# Patient Record
Sex: MALE | Race: OTHER | Employment: FULL TIME | URBAN - METROPOLITAN AREA
[De-identification: names, ages, dates, MRNs, and addresses within clinical notes are randomized per-mention and may not be internally consistent; named-entity substitution may affect disease eponyms.]

---

## 2017-09-04 ENCOUNTER — HOSPITAL ENCOUNTER (EMERGENCY)
Age: 37
Discharge: HOME | End: 2017-09-04
Attending: EMERGENCY MEDICINE | Admitting: EMERGENCY MEDICINE
Payer: SELF-PAY

## 2017-09-04 VITALS
OXYGEN SATURATION: 100 % | SYSTOLIC BLOOD PRESSURE: 151 MMHG | HEIGHT: 66 IN | WEIGHT: 205 LBS | DIASTOLIC BLOOD PRESSURE: 123 MMHG | BODY MASS INDEX: 32.95 KG/M2 | HEART RATE: 73 BPM | RESPIRATION RATE: 16 BRPM | TEMPERATURE: 97.7 F

## 2017-09-04 DIAGNOSIS — S39.012A LUMBAR STRAIN, INITIAL ENCOUNTER: Primary | ICD-10-CM

## 2017-09-04 DIAGNOSIS — R03.0 ELEVATED BLOOD PRESSURE READING: ICD-10-CM

## 2017-09-04 PROCEDURE — 99282 EMERGENCY DEPT VISIT SF MDM: CPT

## 2017-09-04 RX ORDER — CYCLOBENZAPRINE HCL 10 MG
10 TABLET ORAL
Qty: 10 TAB | Refills: 0 | Status: SHIPPED | OUTPATIENT
Start: 2017-09-04

## 2017-09-04 RX ORDER — IBUPROFEN 600 MG/1
600 TABLET ORAL
Status: DISCONTINUED | OUTPATIENT
Start: 2017-09-04 | End: 2017-09-04

## 2017-09-04 RX ORDER — CYCLOBENZAPRINE HCL 10 MG
10 TABLET ORAL
Status: DISCONTINUED | OUTPATIENT
Start: 2017-09-04 | End: 2017-09-04

## 2017-09-04 RX ORDER — IBUPROFEN 600 MG/1
600 TABLET ORAL
Qty: 20 TAB | Refills: 0 | Status: SHIPPED | OUTPATIENT
Start: 2017-09-04

## 2017-09-04 NOTE — DISCHARGE INSTRUCTIONS
Distensión de la espalda: Instrucciones de cuidado - [ Back Strain: Care Instructions ]  Instrucciones de cuidado    La distensión de la espalda ocurre cuando usted estira demasiado, o fuerza, un músculo de la espalda. Usted puede lesionarse la espalda en un accidente o cuando hace ejercicio o levanta algo. La mayoría de los andra de espalda mejoran con reposo y Gabriel. Usted puede cuidarse en el hogar para ayudar a que house espalda sane. La atención de seguimiento es vesna parte clave de house tratamiento y seguridad. Asegúrese de hacer y acudir a todas las citas, y llame a house médico si está teniendo problemas. También es vesna buena idea saber los resultados de los exámenes y mantener vesna lista de los medicamentos que main. ¿Cómo puede cuidarse en el hogar? · Trate de permanecer tan activo dionne pueda, denise deje de hacer o reduzca cualquier actividad que le produzca dolor. · Colóquese hielo o vesna compresa fría en el músculo adolorido de 10 a 20 minutos cada vez para detener la hinchazón. Trate de hacerlo cada 1 o 2 horas javier 3 días (cuando esté despierto) o hasta que la hinchazón baje. Póngase un paño sahu entre el hielo y la piel. · Después de 2 o 3 días, coloque vesna almohadilla térmica ajustada a baja temperatura o un paño tibio sobre la espalda. Algunos médicos sugieren que se alterne entre tratamientos calientes y fríos. · [employer] analgésicos (medicamentos para el dolor) exactamente según las indicaciones. ¨ Si el médico le recetó un analgésico, tómelo según las indicaciones. ¨ Si no está tomando un analgésico recetado, pregúntele a house médico si puede larry lisa de [employer]. · Trate de dormir de lado con vesna almohada entre las piernas. O, colóquese vesna almohada debajo de las rodillas cuando se acueste [place] Estas medidas pueden aliviar el dolor en la parte baja de la espalda. · Clemetine Fawn a poco a house nivel habitual de actividades. ¿Cuándo debe pedir ayuda?   Llame al 911 en cualquier momento que considere que necesita atención de [place]. Por ejemplo, llame si:  · Es completamente incapaz de  vesna pierna. Llame a house médico ahora mismo o busque atención médica inmediata si:  · Tiene síntomas nuevos o peores en las piernas, el abdomen o las nalgas (glúteos). Los síntomas pueden incluir:  ¨ Entumecimiento u hormigueo. ¨ Debilidad. ¨ Dolor. · Pierde el control de la vejiga o del intestino. Preste especial atención a los cambios en house polly y asegúrese de comunicarse con house médico si no mejora dionne se esperaba. ¿Dónde puede encontrar más información en inglés? Dudley Owego a [email or web address].[email or web address] Deborah Main [id number] en la búsqueda para aprender más acerca de \"Distensión de la espalda: Instrucciones de cuidado - [ Back Strain: Care Instructions ]. \"  Revisado: 21 marzo, 2017  Versión del contenido: 11.3  © 4717-8696 Healthwise, Incorporated. Las instrucciones de cuidado fueron adaptadas bajo licencia por [employer] (which disclaims liability or warranty for this information). Si usted tiene [place] afección médica o sobre estas instrucciones, siempre pregunte a house profesional de polly. Healthwise, Incorporated niega toda garantía o responsabilidad por house uso de esta información. Presión arterial gerald: Instrucciones de cuidado - [ High Blood Pressure: Care Instructions ]  Instrucciones de cuidado  Si house presión arterial suele ser superior a 140/90, usted tiene presión arterial gerald o hipertensión. [place] significa que el número de [place] es 140 o superior o que el número de abajo es 90 o superior, o ambas cosas. A pesar de lo que mucha gente carlos, la hipertensión no suele causar dolor de lupe ni provocar mareos o aturdimiento. Generalmente, no presenta síntomas. Sin embargo, aumenta el riesgo de ataque al corazón, ataque cerebral, y daños en los riñones o en los ojos. A mayor presión arterial, mayor riesgo.   House médico le dará vesna meta para house presión arterial. House meta se basará en house polly y house edad. Un ejemplo de meta es mantener house presión arterial por debajo de 140/90. Los cambios de estilo de sharron, dionne alimentarse en forma saludable y [place], siempre son importantes para ayudarle a bajar la presión arterial. También podría larry medicamentos para lograr house meta para la presión arterial.  La atención de seguimiento es vesna parte clave de house tratamiento y seguridad. Asegúrese de hacer y acudir a todas las citas, y llame a house médico si está teniendo problemas. También es vesna buena idea saber los resultados de liang exámenes y mantener vesna lista de los medicamentos que main. ¿Cómo puede cuidarse en el hogar? Tratamiento médico  Si juliette de larry liang medicamentos, house presión arterial volverá a subir. Es posible que deba larry un tipo de [place], o más de [place], para reducir la presión arterial. Sea micaela con los medicamentos. [place] house medicamento exactamente dionne se lo hayan indicado. Llame a house médico si carlos estar teniendo problemas con house medicamento. Hable con house médico antes de empezar a larry Jorge Carty. La aspirina puede ayudar a determinadas personas a reducir house riesgo de tener un ataque cardíaco o un ataque cerebral. Buffy larry aspirina no es adecuado para todo el TRENGEREID, debido a que puede causar sangrado grave. Visite a house médico periódicamente. Usted podría necesitar consultar a house médico con más frecuencia al principio o hasta que se reduzca house presión arterial.  Si usted está tomando medicamentos para la presión arterial, hable con house médico antes de larry medicamentos descongestionantes o antiinflamatorios, dionne ibuprofeno. Algunos de estos medicamentos pueden elevar la presión arterial.  Aprenda a revisarse la presión arterial en house hogar. Cambios en el estilo de sharron  Mantenga un peso saludable. [place] es particularmente importante si aumenta de peso en la guille de la cintura.  Bajar solo 10 libras (4.5 kg) puede ayudarle a reducir house presión arterial.  Ericka más ejercicios si house médico se lo recomienda. Caminar es vesna buena opción. Poco a poco, aumente la distancia que [employer]. Intente hacer por lo menos 30 minutos de ejercicio la mayoría de los días de la [place]. También podría nadar, montar en bicicleta o hacer otras actividades. No tome alcohol o limite la cantidad que [place]. Hable con house médico acerca de si puede larry alcohol. Trate de limitar la cantidad de sodio que consume a menos de 2,300 miligramos (mg) al día. House médico podría pedirle que trate de consumir menos de 1,500 mg al día. Coma abundantes frutas (dionne bananas [plátanos] y naranjas), verduras, legumbres, granos integrales y productos lácteos de bajo contenido de [place]. Reduzca la cantidad de grasas saturadas en house Anatoliy Holiday. Los productos derivados de los [place], dionne la Noni [place], el queso y la carne, contienen grasas saturadas. El limitar estos alimentos podría ayudarle a bajar de peso y [place] reducir house riesgo de vesna enfermedad cardíaca. No fume. El hábito de fumar aumenta house riesgo de ataque cerebral y ataque al corazón. Si necesita ayuda para dejar de fumar, hable con house médico sobre programas y medicamentos para dejar de fumar. Estos pueden aumentar liang probabilidades de dejar el hábito para siempre. ¿Cuándo debe pedir ayuda? Llame al 911 en cualquier momento que considere que necesita atención de [place]. [place] puede significar que tiene síntomas que sugieren que house presión arterial le está causando un problema cardíaco o vascular grave. House presión arterial podría ser superior a 180/110. Por ejemplo, llame al 911 si:  Tiene síntomas de un ataque al corazón. Estos pueden incluir:  Dolor o presión en el pecho, o vesna sensación extraña en el pecho. Sudoración. Falta de aire. Náuseas o vómito.   Dolor, presión o vesna sensación extraña en la espalda, el mago, la mandíbula, la parte superior del abdomen o en lisa o ambos hombros o brazos. Aturdimiento o debilidad repentina. Latidos del corazón rápidos o irregulares. Tiene síntomas de un ataque cerebral. Estos pueden incluir:  Entumecimiento, hormigueo, debilidad o parálisis repentinos en la shailesh, el brazo o la pierna, sobre todo en un solo lado del cuerpo. Cambios repentinos en la visión. Dificultades repentinas para hablar. Confusión repentina o dificultad súbita para comprender frases sencillas. Problemas repentinos para caminar o mantener el equilibrio. Dolor de [place] intenso y repentino, distinto de los andra de [place]. Tiene un dolor intenso en la espalda o el abdomen. No espere a que la presión arterial baje por sí alex. Obtenga ayuda de inmediato. Llame a house médico ahora mismo o busque atención de inmediato si:  Tiene la presión arterial mucho más gerald de lo normal (dionne 180/110 o más gerald), denise no tiene síntomas. Piensa que la presión arterial gerald le está provocando síntomas, dionne:  Dolor de lupe intenso. Chaka Neal. Vigile de cerca los [facility], y asegúrese de comunicarse con house médico si:  House presión arterial mide 140/90 o más en al menos 2 ocasiones. [place] significa que el número de [place] es 140 o superior o el número de abajo es 90 o superior, o ambas cosas. Ruthie que podría estar teniendo efectos secundarios de los medicamentos para la presión arterial.  House presión arterial suele ser normal, denise se eleva por encima de lo normal en al menos 2 ocasiones. ¿Dónde puede encontrar más información en inglés? Gladise Kiloa a [email or web address].[email or web address] Noelle Mora [id number] en la búsqueda para aprender más acerca de \"Presión arterial gerald: Instrucciones de cuidado - [ High Blood Pressure: Care Instructions ]. \"  Revisado: 8 agosto, 2016  Versión del contenido: 11.3  © 4614-2108 [employer].  Las instrucciones de cuidado fueron adaptadas bajo licencia por [employer] (which disclaims liability or warranty for this information). Si usted tiene [place] afección médica o sobre estas instrucciones, siempre pregunte a house profesional de polly. [employer] niega toda garantía o responsabilidad por house uso de esta información.

## 2017-09-04 NOTE — ED PROVIDER NOTES
HPI Comments: Seen at: 9/4/2017 1:36 PM    Alexandre Silva is a 39 y.o. male with no pertinent PMHx, presenting to the ED c/o back pain starting 3 days ago. Pain radiates down bilateral legs, and is exacerbated by straightening legs and walking. He tries tylenol without improvements. He denies numbness and tingling. Pt denies any urinary problems. denies any injury or trauma. No other acute symptoms or complaints were noted. PCP: None    The history is provided by the patient. The history is limited by a language barrier. No  was used. No past medical history on file. No past surgical history on file. No family history on file. Social History     Social History    Marital status:      Spouse name: N/A    Number of children: N/A    Years of education: N/A     Occupational History    Not on file. Social History Main Topics    Smoking status: Not on file    Smokeless tobacco: Not on file    Alcohol use Not on file    Drug use: Not on file    Sexual activity: Not on file     Other Topics Concern    Not on file     Social History Narrative         ALLERGIES: Review of patient's allergies indicates no known allergies. Review of Systems   Constitutional: Negative for fever. Musculoskeletal: Positive for back pain and myalgias. All other systems reviewed and are negative. Vitals:    09/04/17 1333   BP: (!) 151/123   Pulse: 73   Resp: 16   Temp: 97.7 °F (36.5 °C)   SpO2: 100%   Weight: 93 kg (205 lb)   Height: 5' 6\" (1.676 m)            Physical Exam   Constitutional: He is oriented to person, place, and time. He appears well-developed and well-nourished. HENT:   Head: Normocephalic and atraumatic. Neck: Neck supple. No JVD present. Musculoskeletal: He exhibits no edema. No midline spinal body tenderness. Spasm and tenderness on left perispinal region. Negative straight leg raise. Full range of motion in bilateral hip flexor.  Pain with full extension bilaterally. Gross sensation intact. Normal gait. Neurological: He is alert and oriented to person, place, and time. Skin: Skin is warm and dry. No erythema. Psychiatric: Judgment normal.        MDM  Number of Diagnoses or Management Options  Elevated blood pressure reading:   Lumbar strain, initial encounter:   Diagnosis management comments: 40 y/o male presents with back pain    Normal neuro exam, no red flags  No indication for imaging  Consistent with strain, will give motrin, flexeril  Discussed return precautions    Pt noted to have elevated BP. Pt is asymptomatic and was referred to PCP for follow up. ED Course       Procedures    Care plan outlined and precautions discussed. Results were reviewed with the patient. All medications were reviewed with the patient; will d/c home with flexeril and motrin. All of pt's questions and concerns were addressed. Patient was instructed and agrees to follow up with Kendy Staley and [place], as well as to return to the ED upon further deterioration. Patient is ready to go home. Diagnosis:   1. Lumbar strain, initial encounter    2. Elevated blood pressure reading        Disposition: Discharge    Follow-up Information     Follow up With Details Comments Contact Info    [facility]  As needed, If symptoms worsen [address] [facility]  [address] [facility]   [address] [facility] [address]  [phone]    [facility] EMERGENCY DEPT  As needed, If symptoms worsen [address] [facility] [address] [address] [phone]           Patient's Medications   Start Taking    CYCLOBENZAPRINE (FLEXERIL) 10 MG TABLET    Take 1 Tab by mouth three (3) times daily as needed for Muscle Spasm(s). IBUPROFEN (MOTRIN) 600 MG TABLET    Take 1 Tab by mouth every six (6) hours as needed for Pain.    Continue Taking    No medications on file   These Medications have changed    No medications on file   Stop Taking    No medications on file     Scribe Attestation      Dasia GOVEA [facility]) Geremias Hopkins acting as a scribe for and in the presence of Lolita Meckel, DO      September 04, 2017 at 1:38 PM       Provider Attestation:      I personally performed the services described in the documentation, reviewed the documentation, as recorded by the scribe in my presence, and it accurately and completely records my words and actions.  September 04, 2017m at [address], DO

## 2017-09-04 NOTE — LETTER
[address] [facility] EMERGENCY DEPT 
[facility] [address] [id number] 
[phone] Work/School Note Date: 9/4/2017 To Whom It May concern: 
 
Tamie Schmitz was seen and treated today in the emergency room by the following provider(s): 
Attending Provider: Candace Cuenca DO. Tamie Schmitz may return to work on 9/6/17. Sincerely, Candace Cuenca DO